# Patient Record
Sex: FEMALE | Race: BLACK OR AFRICAN AMERICAN | NOT HISPANIC OR LATINO | URBAN - METROPOLITAN AREA
[De-identification: names, ages, dates, MRNs, and addresses within clinical notes are randomized per-mention and may not be internally consistent; named-entity substitution may affect disease eponyms.]

---

## 2019-04-16 ENCOUNTER — OFFICE (OUTPATIENT)
Dept: URBAN - METROPOLITAN AREA CLINIC 33 | Facility: CLINIC | Age: 44
End: 2019-04-16

## 2019-04-16 VITALS
WEIGHT: 144 LBS | HEIGHT: 66 IN | TEMPERATURE: 97.9 F | SYSTOLIC BLOOD PRESSURE: 100 MMHG | DIASTOLIC BLOOD PRESSURE: 61 MMHG | HEART RATE: 74 BPM

## 2019-04-16 DIAGNOSIS — K59.09 OTHER CONSTIPATION: ICD-10-CM

## 2019-04-16 DIAGNOSIS — K64.0 FIRST DEGREE HEMORRHOIDS: ICD-10-CM

## 2019-04-16 DIAGNOSIS — K92.2 GASTROINTESTINAL HEMORRHAGE, UNSPECIFIED: ICD-10-CM

## 2019-04-16 PROCEDURE — 99243 OFF/OP CNSLTJ NEW/EST LOW 30: CPT

## 2020-08-04 ENCOUNTER — TELEHEALTH PROVIDED OTHER THAN IN PATIENT'S HOME (OUTPATIENT)
Dept: URBAN - METROPOLITAN AREA TELEHEALTH 7 | Facility: TELEHEALTH | Age: 45
End: 2020-08-04
Payer: COMMERCIAL

## 2020-08-04 VITALS — WEIGHT: 130 LBS | HEIGHT: 66 IN

## 2020-08-04 DIAGNOSIS — K62.5 HEMORRHAGE OF ANUS AND RECTUM: ICD-10-CM

## 2020-08-04 DIAGNOSIS — K59.09 OTHER CONSTIPATION: ICD-10-CM

## 2020-08-04 DIAGNOSIS — K64.0 FIRST DEGREE HEMORRHOIDS: ICD-10-CM

## 2020-08-04 PROCEDURE — 99213 OFFICE O/P EST LOW 20 MIN: CPT | Mod: 95 | Performed by: INTERNAL MEDICINE

## 2020-08-04 NOTE — SERVICEHPINOTES
PATIENT VERIFIED BY DATE OF BIRTH AND NAME. Patient has been consented for this telecommunication visit. 45 yo female last seen over a year ago with rectal bleeding due to int hemorrhoids was recommended to consider colonoscopy and have banding however did not followup. SHe has had 2 colonoscopies in the past 5 years that have been ok other than the hemorrhoids. SHe was recommended to continue hemorrhoidal ointment and fiber supplements.  She notes continued rectal bleeding. She feels bowels are a little better with improved diet. SHe has occ straining. She is on iron.

## 2021-05-27 ENCOUNTER — OFFICE (OUTPATIENT)
Dept: URBAN - METROPOLITAN AREA CLINIC 102 | Facility: CLINIC | Age: 46
End: 2021-05-27

## 2021-05-27 DIAGNOSIS — K62.5 HEMORRHAGE OF ANUS AND RECTUM: ICD-10-CM

## 2021-05-27 DIAGNOSIS — K62.89 OTHER SPECIFIED DISEASES OF ANUS AND RECTUM: ICD-10-CM

## 2021-05-27 DIAGNOSIS — K64.8 OTHER HEMORRHOIDS: ICD-10-CM

## 2021-05-27 DIAGNOSIS — K59.4 ANAL SPASM: ICD-10-CM

## 2021-05-27 PROCEDURE — 99214 OFFICE O/P EST MOD 30 MIN: CPT | Performed by: INTERNAL MEDICINE

## 2021-05-28 VITALS
SYSTOLIC BLOOD PRESSURE: 985 MMHG | DIASTOLIC BLOOD PRESSURE: 8 MMHG | HEIGHT: 66 IN | TEMPERATURE: 97.5 F | WEIGHT: 125 LBS | HEART RATE: 74 BPM

## 2024-07-11 ENCOUNTER — HOSPITAL ENCOUNTER (EMERGENCY)
Facility: HOSPITAL | Age: 49
Discharge: HOME | End: 2024-07-11
Attending: EMERGENCY MEDICINE
Payer: COMMERCIAL

## 2024-07-11 ENCOUNTER — APPOINTMENT (OUTPATIENT)
Facility: HOSPITAL | Age: 49
End: 2024-07-11
Attending: EMERGENCY MEDICINE
Payer: COMMERCIAL

## 2024-07-11 VITALS
BODY MASS INDEX: 19.33 KG/M2 | OXYGEN SATURATION: 99 % | SYSTOLIC BLOOD PRESSURE: 110 MMHG | RESPIRATION RATE: 20 BRPM | TEMPERATURE: 98.2 F | WEIGHT: 135 LBS | HEART RATE: 90 BPM | HEIGHT: 70 IN | DIASTOLIC BLOOD PRESSURE: 72 MMHG

## 2024-07-11 DIAGNOSIS — R56.9 SEIZURE-LIKE ACTIVITY (HCC): Primary | ICD-10-CM

## 2024-07-11 LAB
ALBUMIN SERPL-MCNC: 4.1 G/DL (ref 3.5–5)
ALBUMIN/GLOB SERPL: 1.2 (ref 1.1–2.2)
ALP SERPL-CCNC: 81 U/L (ref 45–117)
ALT SERPL-CCNC: 22 U/L (ref 12–78)
AMPHET UR QL SCN: NEGATIVE
ANION GAP SERPL CALC-SCNC: 5 MMOL/L (ref 5–15)
APPEARANCE UR: CLEAR
AST SERPL W P-5'-P-CCNC: 13 U/L (ref 15–37)
BACTERIA URNS QL MICRO: NEGATIVE /HPF
BARBITURATES UR QL SCN: NEGATIVE
BASOPHILS # BLD: 0 K/UL (ref 0–0.1)
BASOPHILS NFR BLD: 0 % (ref 0–1)
BENZODIAZ UR QL: NEGATIVE
BILIRUB SERPL-MCNC: 0.8 MG/DL (ref 0.2–1)
BILIRUB UR QL: NEGATIVE
BUN SERPL-MCNC: 11 MG/DL (ref 6–20)
BUN/CREAT SERPL: 12 (ref 12–20)
CA-I BLD-MCNC: 9.3 MG/DL (ref 8.5–10.1)
CANNABINOIDS UR QL SCN: NEGATIVE
CHLORIDE SERPL-SCNC: 107 MMOL/L (ref 97–108)
CO2 SERPL-SCNC: 27 MMOL/L (ref 21–32)
COCAINE UR QL SCN: NEGATIVE
COLOR UR: ABNORMAL
CREAT SERPL-MCNC: 0.94 MG/DL (ref 0.55–1.02)
DIFFERENTIAL METHOD BLD: ABNORMAL
EOSINOPHIL # BLD: 0 K/UL (ref 0–0.4)
EOSINOPHIL NFR BLD: 0 % (ref 0–7)
EPITH CASTS URNS QL MICRO: ABNORMAL /LPF
ERYTHROCYTE [DISTWIDTH] IN BLOOD BY AUTOMATED COUNT: 13.5 % (ref 11.5–14.5)
GLOBULIN SER CALC-MCNC: 3.4 G/DL (ref 2–4)
GLUCOSE SERPL-MCNC: 146 MG/DL (ref 65–100)
GLUCOSE UR STRIP.AUTO-MCNC: NEGATIVE MG/DL
HCT VFR BLD AUTO: 46.9 % (ref 35–47)
HGB BLD-MCNC: 15.9 G/DL (ref 11.5–16)
HGB UR QL STRIP: ABNORMAL
HYALINE CASTS URNS QL MICRO: ABNORMAL /LPF (ref 0–5)
IMM GRANULOCYTES # BLD AUTO: 0.1 K/UL (ref 0–0.04)
IMM GRANULOCYTES NFR BLD AUTO: 0 % (ref 0–0.5)
KETONES UR QL STRIP.AUTO: 5 MG/DL
LACTATE BLD-SCNC: 0.84 MMOL/L (ref 0.4–2)
LEUKOCYTE ESTERASE UR QL STRIP.AUTO: NEGATIVE
LYMPHOCYTES # BLD: 1.2 K/UL (ref 0.8–3.5)
LYMPHOCYTES NFR BLD: 8 % (ref 12–49)
Lab: NORMAL
MCH RBC QN AUTO: 30.3 PG (ref 26–34)
MCHC RBC AUTO-ENTMCNC: 33.9 G/DL (ref 30–36.5)
MCV RBC AUTO: 89.3 FL (ref 80–99)
METHADONE UR QL: NEGATIVE
MONOCYTES # BLD: 0.8 K/UL (ref 0–1)
MONOCYTES NFR BLD: 6 % (ref 5–13)
MUCOUS THREADS URNS QL MICRO: ABNORMAL /LPF
NEUTS SEG # BLD: 12.4 K/UL (ref 1.8–8)
NEUTS SEG NFR BLD: 86 % (ref 32–75)
NITRITE UR QL STRIP.AUTO: NEGATIVE
NRBC # BLD: 0 K/UL (ref 0–0.01)
NRBC BLD-RTO: 0 PER 100 WBC
OPIATES UR QL: NEGATIVE
PCP UR QL: NEGATIVE
PERFORMED BY:: NORMAL
PH UR STRIP: 6 (ref 5–8)
PLATELET # BLD AUTO: 244 K/UL (ref 150–400)
PMV BLD AUTO: 9.9 FL (ref 8.9–12.9)
POTASSIUM SERPL-SCNC: 3.8 MMOL/L (ref 3.5–5.1)
PROT SERPL-MCNC: 7.5 G/DL (ref 6.4–8.2)
PROT UR STRIP-MCNC: NEGATIVE MG/DL
RBC # BLD AUTO: 5.25 M/UL (ref 3.8–5.2)
RBC #/AREA URNS HPF: ABNORMAL /HPF (ref 0–5)
SODIUM SERPL-SCNC: 139 MMOL/L (ref 136–145)
SP GR UR REFRACTOMETRY: 1.02 (ref 1–1.03)
TROPONIN I SERPL HS-MCNC: 9 NG/L (ref 0–51)
URINE CULTURE IF INDICATED: ABNORMAL
UROBILINOGEN UR QL STRIP.AUTO: 0.1 EU/DL (ref 0.1–1)
WBC # BLD AUTO: 14.5 K/UL (ref 3.6–11)
WBC URNS QL MICRO: ABNORMAL /HPF (ref 0–4)

## 2024-07-11 PROCEDURE — 81001 URINALYSIS AUTO W/SCOPE: CPT

## 2024-07-11 PROCEDURE — 85025 COMPLETE CBC W/AUTO DIFF WBC: CPT

## 2024-07-11 PROCEDURE — 70450 CT HEAD/BRAIN W/O DYE: CPT

## 2024-07-11 PROCEDURE — 80053 COMPREHEN METABOLIC PANEL: CPT

## 2024-07-11 PROCEDURE — 84484 ASSAY OF TROPONIN QUANT: CPT

## 2024-07-11 PROCEDURE — 80307 DRUG TEST PRSMV CHEM ANLYZR: CPT

## 2024-07-11 PROCEDURE — 99284 EMERGENCY DEPT VISIT MOD MDM: CPT

## 2024-07-11 PROCEDURE — 93005 ELECTROCARDIOGRAM TRACING: CPT | Performed by: EMERGENCY MEDICINE

## 2024-07-11 PROCEDURE — 36415 COLL VENOUS BLD VENIPUNCTURE: CPT

## 2024-07-11 PROCEDURE — 72125 CT NECK SPINE W/O DYE: CPT

## 2024-07-11 PROCEDURE — 83605 ASSAY OF LACTIC ACID: CPT

## 2024-07-11 RX ORDER — 0.9 % SODIUM CHLORIDE 0.9 %
1000 INTRAVENOUS SOLUTION INTRAVENOUS
Status: DISCONTINUED | OUTPATIENT
Start: 2024-07-11 | End: 2024-07-11 | Stop reason: HOSPADM

## 2024-07-11 ASSESSMENT — PAIN - FUNCTIONAL ASSESSMENT: PAIN_FUNCTIONAL_ASSESSMENT: NONE - DENIES PAIN

## 2024-07-11 NOTE — DISCHARGE INSTRUCTIONS
care, recommend confirmation of positive findings by GC/MS.     POC Lactic Acid    Collection Time: 07/11/24  1:41 PM   Result Value Ref Range    POC Lactic Acid 0.84 0.40 - 2.00 mmol/L    Performed by: Howard Hadley    Troponin    Collection Time: 07/11/24  3:12 PM   Result Value Ref Range    Troponin, High Sensitivity 9 0 - 51 ng/L       Radiologic Studies  CT Head W/O Contrast   Final Result      No evidence for acute intracranial abnormality               Electronically signed by Laura Wynn      CT CSpine W/O Contrast   Final Result   No acute fracture      Electronically signed by ROHITH THURMAN        ------------------------------------------------------------------------------------------------------------  The evaluation and treatment you received in the Emergency Department were for an urgent problem. It is important that you follow-up with a doctor, nurse practitioner, or physician assistant to:  (1) confirm your diagnosis,  (2) re-evaluation of changes in your illness and treatment, and (3) for ongoing care. Please take your discharge instructions with you when you go to your follow-up appointment.     If you have any problem arranging a follow-up appointment, contact us!  If your symptoms become worse or you do not improve as expected, please return to us. We are available 24 hours a day.     If a prescription has been provided, please fill it as soon as possible to prevent a delay in treatment. If you have any questions or reservations about taking the medication due to side effects or interactions with other medications, please call your primary care provider or contact us directly.  Again, THANK YOU for choosing us to care for YOU!

## 2024-07-11 NOTE — ED TRIAGE NOTES
Per ems, pt present from [facility] with seizures. Pt denies any hx of seizures. 2mg of ativan given by staff at facility, at 1141 today, pt precede to have delirium stage per staff. VS stable en route, .

## 2024-07-11 NOTE — ED PROVIDER NOTES
[facility] EMERGENCY DEPT  EMERGENCY DEPARTMENT HISTORY AND PHYSICAL EXAM      Date: 7/11/2024  Patient Name: Paul Shea  MRN: [id number]  Birthdate 1975  Date of evaluation: 7/11/2024  Provider: Julian Burgos MD   Note Started: 1:13 PM EDT 7/11/24    HISTORY OF PRESENT ILLNESS     Chief Complaint   Patient presents with    Seizures       History Provided By: Patient and EMS    HPI: Paul Shea is a 48 y.o. female presents for evaluation of witnessed seizure-like activity.  EMS states they were told 2 mg of Ativan were given by staff at the facility at 1141 this morning.  Blood glucose 131 per EMS.  Patient alert and oriented, complaining only of some sleepiness but no other somatic complaints.  Patient denies prior history of stroke.    PAST MEDICAL HISTORY   Past Medical History:  Past Medical History:   Diagnosis Date    Bipolar 1 disorder (HCC)        Past Surgical History:  No past surgical history on file.    Family History:  No family history on file.    Social History:       Allergies:  Not on File    PCP: None, None    Current Meds:   Current Facility-Administered Medications   Medication Dose Route Frequency Provider Last Rate Last Admin    sodium chloride 0.9 % bolus 1,000 mL  1,000 mL IntraVENous NOW Julian Burgos MD         No current outpatient medications on file.       Social Determinants of Health:   Social Determinants of Health     Tobacco Use: Not on file   Alcohol Use: Not on file   Financial Resource Strain: Not on file   Food Insecurity: Not on file   Transportation Needs: Not on file   Physical Activity: Not on file   Stress: Not on file   Social Connections: Not on file   Intimate Partner Violence: Not on file   Depression: Not on file   Housing Stability: Not on file   Interpersonal Safety: Not At Risk (7/11/2024)    Interpersonal Safety Domain Source: IP Abuse Screening     Physical abuse: Denies     Verbal abuse: Denies     Emotional abuse: Denies     Financial abuse:

## 2024-07-12 LAB
EKG ATRIAL RATE: 74 BPM
EKG DIAGNOSIS: NORMAL
EKG P AXIS: 13 DEGREES
EKG P-R INTERVAL: 96 MS
EKG Q-T INTERVAL: 380 MS
EKG QRS DURATION: 70 MS
EKG QTC CALCULATION (BAZETT): 421 MS
EKG R AXIS: 66 DEGREES
EKG T AXIS: 59 DEGREES
EKG VENTRICULAR RATE: 74 BPM

## 2024-08-04 ENCOUNTER — HOSPITAL ENCOUNTER (EMERGENCY)
Facility: HOSPITAL | Age: 49
Discharge: HOME | End: 2024-08-04
Attending: EMERGENCY MEDICINE
Payer: COMMERCIAL

## 2024-08-04 ENCOUNTER — APPOINTMENT (OUTPATIENT)
Facility: HOSPITAL | Age: 49
End: 2024-08-04
Payer: COMMERCIAL

## 2024-08-04 VITALS
TEMPERATURE: 98.9 F | HEART RATE: 80 BPM | BODY MASS INDEX: 24.05 KG/M2 | DIASTOLIC BLOOD PRESSURE: 66 MMHG | SYSTOLIC BLOOD PRESSURE: 127 MMHG | WEIGHT: 168 LBS | RESPIRATION RATE: 18 BRPM | HEIGHT: 70 IN | OXYGEN SATURATION: 98 %

## 2024-08-04 DIAGNOSIS — R33.9 URINARY RETENTION: ICD-10-CM

## 2024-08-04 DIAGNOSIS — K59.00 CONSTIPATION, UNSPECIFIED CONSTIPATION TYPE: Primary | ICD-10-CM

## 2024-08-04 LAB
ALBUMIN SERPL-MCNC: 3.5 G/DL (ref 3.5–5)
ALBUMIN/GLOB SERPL: 1.1 (ref 1.1–2.2)
ALP SERPL-CCNC: 83 U/L (ref 45–117)
ALT SERPL-CCNC: 16 U/L (ref 12–78)
ANION GAP SERPL CALC-SCNC: 8 MMOL/L (ref 5–15)
APPEARANCE UR: ABNORMAL
AST SERPL W P-5'-P-CCNC: 18 U/L (ref 15–37)
BACTERIA URNS QL MICRO: NEGATIVE /HPF
BASOPHILS # BLD: 0 K/UL (ref 0–0.1)
BASOPHILS NFR BLD: 0 % (ref 0–1)
BILIRUB DIRECT SERPL-MCNC: 0.2 MG/DL (ref 0–0.2)
BILIRUB UR QL: NEGATIVE
BUN SERPL-MCNC: 10 MG/DL (ref 6–20)
BUN/CREAT SERPL: 16 (ref 12–20)
CA-I BLD-MCNC: 9.1 MG/DL (ref 8.5–10.1)
CHLORIDE SERPL-SCNC: 110 MMOL/L (ref 97–108)
CO2 SERPL-SCNC: 25 MMOL/L (ref 21–32)
COLOR UR: ABNORMAL
CREAT SERPL-MCNC: 0.61 MG/DL (ref 0.55–1.02)
DIFFERENTIAL METHOD BLD: ABNORMAL
EOSINOPHIL # BLD: 0 K/UL (ref 0–0.4)
EOSINOPHIL NFR BLD: 0 % (ref 0–7)
EPITH CASTS URNS QL MICRO: ABNORMAL /LPF
ERYTHROCYTE [DISTWIDTH] IN BLOOD BY AUTOMATED COUNT: 13.4 % (ref 11.5–14.5)
GLOBULIN SER CALC-MCNC: 3.2 G/DL (ref 2–4)
GLUCOSE SERPL-MCNC: 115 MG/DL (ref 65–100)
GLUCOSE UR STRIP.AUTO-MCNC: NEGATIVE MG/DL
HCT VFR BLD AUTO: 37 % (ref 35–47)
HGB BLD-MCNC: 12.7 G/DL (ref 11.5–16)
HGB UR QL STRIP: ABNORMAL
IMM GRANULOCYTES # BLD AUTO: 0.1 K/UL (ref 0–0.04)
IMM GRANULOCYTES NFR BLD AUTO: 0 % (ref 0–0.5)
KETONES UR QL STRIP.AUTO: 5 MG/DL
LEUKOCYTE ESTERASE UR QL STRIP.AUTO: NEGATIVE
LIPASE SERPL-CCNC: 13 U/L (ref 13–75)
LYMPHOCYTES # BLD: 0.7 K/UL (ref 0.8–3.5)
LYMPHOCYTES NFR BLD: 5 % (ref 12–49)
MCH RBC QN AUTO: 30.2 PG (ref 26–34)
MCHC RBC AUTO-ENTMCNC: 34.3 G/DL (ref 30–36.5)
MCV RBC AUTO: 88.1 FL (ref 80–99)
MONOCYTES # BLD: 1.3 K/UL (ref 0–1)
MONOCYTES NFR BLD: 8 % (ref 5–13)
MUCOUS THREADS URNS QL MICRO: ABNORMAL /LPF
NEUTS SEG # BLD: 13.6 K/UL (ref 1.8–8)
NEUTS SEG NFR BLD: 87 % (ref 32–75)
NITRITE UR QL STRIP.AUTO: NEGATIVE
NRBC # BLD: 0 K/UL (ref 0–0.01)
NRBC BLD-RTO: 0 PER 100 WBC
PH UR STRIP: 5 (ref 5–8)
PLATELET # BLD AUTO: 252 K/UL (ref 150–400)
PMV BLD AUTO: 9.6 FL (ref 8.9–12.9)
POTASSIUM SERPL-SCNC: 4.2 MMOL/L (ref 3.5–5.1)
PROT SERPL-MCNC: 6.7 G/DL (ref 6.4–8.2)
PROT UR STRIP-MCNC: 30 MG/DL
RBC # BLD AUTO: 4.2 M/UL (ref 3.8–5.2)
RBC #/AREA URNS HPF: ABNORMAL /HPF (ref 0–5)
SODIUM SERPL-SCNC: 143 MMOL/L (ref 136–145)
SP GR UR REFRACTOMETRY: 1.02 (ref 1–1.03)
UROBILINOGEN UR QL STRIP.AUTO: 2 EU/DL (ref 0.1–1)
WBC # BLD AUTO: 15.8 K/UL (ref 3.6–11)
WBC URNS QL MICRO: ABNORMAL /HPF (ref 0–4)

## 2024-08-04 PROCEDURE — 6360000004 HC RX CONTRAST MEDICATION: Performed by: EMERGENCY MEDICINE

## 2024-08-04 PROCEDURE — 51701 INSERT BLADDER CATHETER: CPT

## 2024-08-04 PROCEDURE — 80076 HEPATIC FUNCTION PANEL: CPT

## 2024-08-04 PROCEDURE — 83690 ASSAY OF LIPASE: CPT

## 2024-08-04 PROCEDURE — 99285 EMERGENCY DEPT VISIT HI MDM: CPT

## 2024-08-04 PROCEDURE — 85025 COMPLETE CBC W/AUTO DIFF WBC: CPT

## 2024-08-04 PROCEDURE — 81001 URINALYSIS AUTO W/SCOPE: CPT

## 2024-08-04 PROCEDURE — 96375 TX/PRO/DX INJ NEW DRUG ADDON: CPT

## 2024-08-04 PROCEDURE — 96374 THER/PROPH/DIAG INJ IV PUSH: CPT

## 2024-08-04 PROCEDURE — 74177 CT ABD & PELVIS W/CONTRAST: CPT

## 2024-08-04 PROCEDURE — 36415 COLL VENOUS BLD VENIPUNCTURE: CPT

## 2024-08-04 PROCEDURE — 6360000002 HC RX W HCPCS: Performed by: EMERGENCY MEDICINE

## 2024-08-04 PROCEDURE — 80048 BASIC METABOLIC PNL TOTAL CA: CPT

## 2024-08-04 RX ORDER — ONDANSETRON 2 MG/ML
4 INJECTION INTRAMUSCULAR; INTRAVENOUS ONCE
Status: COMPLETED | OUTPATIENT
Start: 2024-08-04 | End: 2024-08-04

## 2024-08-04 RX ORDER — KETOROLAC TROMETHAMINE 15 MG/ML
15 INJECTION, SOLUTION INTRAMUSCULAR; INTRAVENOUS
Status: COMPLETED | OUTPATIENT
Start: 2024-08-04 | End: 2024-08-04

## 2024-08-04 RX ORDER — POLYETHYLENE GLYCOL 3350 17 G/17G
17 POWDER, FOR SOLUTION ORAL DAILY
Qty: 116 G | Refills: 0 | Status: SHIPPED | OUTPATIENT
Start: 2024-08-04

## 2024-08-04 RX ADMIN — IOPAMIDOL 100 ML: 755 INJECTION, SOLUTION INTRAVENOUS at 16:36

## 2024-08-04 RX ADMIN — KETOROLAC TROMETHAMINE 15 MG: 15 INJECTION, SOLUTION INTRAMUSCULAR; INTRAVENOUS at 19:13

## 2024-08-04 RX ADMIN — ONDANSETRON 4 MG: 2 INJECTION INTRAMUSCULAR; INTRAVENOUS at 19:13

## 2024-08-04 ASSESSMENT — PAIN - FUNCTIONAL ASSESSMENT
PAIN_FUNCTIONAL_ASSESSMENT: NONE - DENIES PAIN
PAIN_FUNCTIONAL_ASSESSMENT: 0-10

## 2024-08-04 ASSESSMENT — PAIN SCALES - GENERAL
PAINLEVEL_OUTOF10: 0
PAINLEVEL_OUTOF10: 7
PAINLEVEL_OUTOF10: 7

## 2024-08-04 ASSESSMENT — PAIN DESCRIPTION - LOCATION
LOCATION: BUTTOCKS
LOCATION: ABDOMEN

## 2024-08-04 ASSESSMENT — LIFESTYLE VARIABLES
HOW OFTEN DO YOU HAVE A DRINK CONTAINING ALCOHOL: NEVER
HOW MANY STANDARD DRINKS CONTAINING ALCOHOL DO YOU HAVE ON A TYPICAL DAY: PATIENT DOES NOT DRINK

## 2024-08-04 NOTE — ED TRIAGE NOTES
Per EMS: Patient lives in [facility], patient complaining of lower abdominal pain since 5 days, in conjunction with constipation, patient is also complaining of foul smelling urine.

## 2024-08-04 NOTE — ED PROVIDER NOTES
Course as of 08/04/24 2031   Sun Aug 04, 2024   2027 Patient with good results after her enema, had removal of urine via straight cath and reports feeling better at this time.  Discussed with patient and caregiver the plan to start on daily MiraLAX.  Return precautions discussed at length. [CS]      ED Course User Index  [CS] Julian Burgos MD       SEPSIS Reassessment: Sepsis reassessment not applicable    Clinical Management Tools:  Not Applicable    Patient was given the following medications:  Medications   ketorolac (TORADOL) injection 15 mg (15 mg IntraVENous Given 8/4/24 1913)   ondansetron (ZOFRAN) injection 4 mg (4 mg IntraVENous Given 8/4/24 1913)   iopamidol (ISOVUE-370) 76 % injection 100 mL (100 mLs IntraVENous Given 8/4/24 1636)       CONSULTS: See ED Course/MDM for further details.  None     Social Determinants affecting Diagnosis/Treatment: None    Smoking Cessation: Not Applicable    PROCEDURES   Unless otherwise noted above, none  Procedures      CRITICAL CARE TIME   Patient does not meet Critical Care Time, 0 minutes    ED IMPRESSION     1. Constipation, unspecified constipation type    2. Urinary retention          DISPOSITION/PLAN   DISPOSITION Decision To Discharge 08/04/2024 08:29:28 PM    Discharge Note: The patient is stable for discharge home. The signs, symptoms, diagnosis, and discharge instructions have been discussed, understanding conveyed, and agreed upon. The patient is to follow up as recommended or return to ER should their symptoms worsen.      PATIENT REFERRED TO:  George Mabry MD  [address] [facility] [address]  [phone]    Schedule an appointment as soon as possible for a visit       Ilda Germain MD  [address] [place] [id number]  [phone]    Schedule an appointment as soon as possible for a visit           DISCHARGE MEDICATIONS:     Medication List        START taking these medications      polyethylene glycol 17

## 2024-08-05 NOTE — DISCHARGE INSTRUCTIONS
Thank you for choosing our Emergency Department for your care.  It is our privilege to care for you in your time of need.  In the next several days, you may receive a survey via email or mailed to your home about your experience with our team.  We would greatly appreciate you taking a few minutes to complete the survey, as we use this information to learn what we have done well and what we could be doing better. Thank you for trusting us with your care!    Below you will find a list of your tests from today's visit.   Labs  Recent Results (from the past 12 hour(s))   BMP    Collection Time: 08/04/24  4:03 PM   Result Value Ref Range    Sodium 143 136 - 145 mmol/L    Potassium 4.2 3.5 - 5.1 mmol/L    Chloride 110 (H) 97 - 108 mmol/L    CO2 25 21 - 32 mmol/L    Anion Gap 8 5 - 15 mmol/L    Glucose 115 (H) 65 - 100 mg/dL    BUN 10 6 - 20 mg/dL    Creatinine 0.61 0.55 - 1.02 mg/dL    BUN/Creatinine Ratio 16 12 - 20      Est, Glom Filt Rate >90 >60 ml/min/1.73m2    Calcium 9.1 8.5 - 10.1 mg/dL   CBC with Auto Differential    Collection Time: 08/04/24  4:03 PM   Result Value Ref Range    WBC 15.8 (H) 3.6 - 11.0 K/uL    RBC 4.20 3.80 - 5.20 M/uL    Hemoglobin 12.7 11.5 - 16.0 g/dL    Hematocrit 37.0 35.0 - 47.0 %    MCV 88.1 80.0 - 99.0 FL    MCH 30.2 26.0 - 34.0 PG    MCHC 34.3 30.0 - 36.5 g/dL    RDW 13.4 11.5 - 14.5 %    Platelets 252 150 - 400 K/uL    MPV 9.6 8.9 - 12.9 FL    Nucleated RBCs 0.0 0.0  WBC    nRBC 0.00 0.00 - 0.01 K/uL    Neutrophils % 87 (H) 32 - 75 %    Lymphocytes % 5 (L) 12 - 49 %    Monocytes % 8 5 - 13 %    Eosinophils % 0 0 - 7 %    Basophils % 0 0 - 1 %    Immature Granulocytes % 0 0 - 0.5 %    Neutrophils Absolute 13.6 (H) 1.8 - 8.0 K/UL    Lymphocytes Absolute 0.7 (L) 0.8 - 3.5 K/UL    Monocytes Absolute 1.3 (H) 0.0 - 1.0 K/UL    Eosinophils Absolute 0.0 0.0 - 0.4 K/UL    Basophils Absolute 0.0 0.0 - 0.1 K/UL    Immature Granulocytes Absolute 0.1 (H) 0.00 - 0.04 K/UL    Differential

## 2024-08-05 NOTE — ED NOTES
Pt laying semi fowlers in bed with eyes open, adequate chest rise and fall, strong radial pulse, and skin color WNL. Pt currently has no complaints.

## 2024-08-06 ENCOUNTER — HOSPITAL ENCOUNTER (EMERGENCY)
Facility: HOSPITAL | Age: 49
Discharge: HOME | End: 2024-08-07
Attending: EMERGENCY MEDICINE
Payer: COMMERCIAL

## 2024-08-06 ENCOUNTER — APPOINTMENT (OUTPATIENT)
Facility: HOSPITAL | Age: 49
End: 2024-08-06
Payer: COMMERCIAL

## 2024-08-06 DIAGNOSIS — R33.8 ACUTE URINARY RETENTION: Primary | ICD-10-CM

## 2024-08-06 PROCEDURE — 74018 RADEX ABDOMEN 1 VIEW: CPT

## 2024-08-06 PROCEDURE — 51798 US URINE CAPACITY MEASURE: CPT

## 2024-08-06 PROCEDURE — 99284 EMERGENCY DEPT VISIT MOD MDM: CPT

## 2024-08-06 PROCEDURE — 80048 BASIC METABOLIC PNL TOTAL CA: CPT

## 2024-08-06 PROCEDURE — 81001 URINALYSIS AUTO W/SCOPE: CPT

## 2024-08-06 ASSESSMENT — PAIN - FUNCTIONAL ASSESSMENT: PAIN_FUNCTIONAL_ASSESSMENT: 0-10

## 2024-08-06 ASSESSMENT — PAIN SCALES - GENERAL: PAINLEVEL_OUTOF10: 7

## 2024-08-07 VITALS
HEIGHT: 70 IN | TEMPERATURE: 98.6 F | SYSTOLIC BLOOD PRESSURE: 116 MMHG | WEIGHT: 168 LBS | HEART RATE: 82 BPM | OXYGEN SATURATION: 100 % | BODY MASS INDEX: 24.05 KG/M2 | RESPIRATION RATE: 16 BRPM | DIASTOLIC BLOOD PRESSURE: 70 MMHG

## 2024-08-07 LAB
ANION GAP SERPL CALC-SCNC: 8 MMOL/L (ref 5–15)
APPEARANCE UR: CLEAR
BACTERIA URNS QL MICRO: NEGATIVE /HPF
BILIRUB UR QL: NEGATIVE
BUN SERPL-MCNC: 10 MG/DL (ref 6–20)
BUN/CREAT SERPL: 13 (ref 12–20)
CA-I BLD-MCNC: 9.4 MG/DL (ref 8.5–10.1)
CHLORIDE SERPL-SCNC: 104 MMOL/L (ref 97–108)
CO2 SERPL-SCNC: 25 MMOL/L (ref 21–32)
COLOR UR: ABNORMAL
CREAT SERPL-MCNC: 0.78 MG/DL (ref 0.55–1.02)
EPITH CASTS URNS QL MICRO: ABNORMAL /LPF
GLUCOSE SERPL-MCNC: 110 MG/DL (ref 65–100)
GLUCOSE UR STRIP.AUTO-MCNC: NEGATIVE MG/DL
HGB UR QL STRIP: ABNORMAL
KETONES UR QL STRIP.AUTO: 5 MG/DL
LEUKOCYTE ESTERASE UR QL STRIP.AUTO: NEGATIVE
MUCOUS THREADS URNS QL MICRO: ABNORMAL /LPF
NITRITE UR QL STRIP.AUTO: NEGATIVE
PH UR STRIP: 5 (ref 5–8)
POTASSIUM SERPL-SCNC: 3.9 MMOL/L (ref 3.5–5.1)
PROT UR STRIP-MCNC: NEGATIVE MG/DL
RBC #/AREA URNS HPF: ABNORMAL /HPF (ref 0–5)
SODIUM SERPL-SCNC: 137 MMOL/L (ref 136–145)
SP GR UR REFRACTOMETRY: 1.01 (ref 1–1.03)
URINE CULTURE IF INDICATED: ABNORMAL
UROBILINOGEN UR QL STRIP.AUTO: 0.1 EU/DL (ref 0.1–1)
WBC URNS QL MICRO: ABNORMAL /HPF (ref 0–4)

## 2024-08-07 ASSESSMENT — PAIN SCALES - GENERAL
PAINLEVEL_OUTOF10: 0
PAINLEVEL_OUTOF10: 3

## 2024-08-07 ASSESSMENT — PAIN - FUNCTIONAL ASSESSMENT
PAIN_FUNCTIONAL_ASSESSMENT: 0-10
PAIN_FUNCTIONAL_ASSESSMENT: 0-10

## 2024-08-07 NOTE — ED NOTES
Report given to [facility] supervisor on pt with recommendation to straight cath every 4-6 hours and discontinue trazodone.

## 2024-08-07 NOTE — ED NOTES
Nurse spoke with Tresa castro at [facility] and they are unable to take patient back with esparza or with straight cath orders either. Provider aware.

## 2024-08-07 NOTE — DISCHARGE INSTRUCTIONS
I recommend that you stop taking trazodone and coordination with your doctors at [facility] as this medication may be causing your difficulty with urinating.  You need to intermittently straight cath every 4-6 hours if you are not able to adequately empty her bladder on her own.        Thank you for choosing our Emergency Department for your care.  It is our privilege to care for you in your time of need.  In the next several days, you may receive a survey via email or mailed to your home about your experience with our team.  We would greatly appreciate you taking a few minutes to complete the survey, as we use this information to learn what we have done well and what we could be doing better. Thank you for trusting us with your care!    Below you will find a list of your tests from today's visit.   Labs  Recent Results (from the past 12 hour(s))   Urinalysis with Reflex to Culture    Collection Time: 08/06/24 11:50 PM    Specimen: Urine   Result Value Ref Range    Color, UA Yellow/Straw      Appearance Clear Clear      Specific Gravity, UA 1.011 1.003 - 1.030      pH, Urine 5.0 5.0 - 8.0      Protein, UA Negative Negative mg/dL    Glucose, Ur Negative Negative mg/dL    Ketones, Urine 5 (A) Negative mg/dL    Bilirubin, Urine Negative Negative      Blood, Urine Large (A) Negative      Urobilinogen, Urine 0.1 0.1 - 1.0 EU/dL    Nitrite, Urine Negative Negative      Leukocyte Esterase, Urine Negative Negative      WBC, UA 0-4 0 - 4 /hpf    RBC, UA 10-20 0 - 5 /hpf    Epithelial Cells, UA Few Few /lpf    BACTERIA, URINE Negative Negative /hpf    Urine Culture if Indicated Culture not indicated by UA result Culture not indicated by UA result      Mucus, UA 1+ (A) Negative /lpf   BMP    Collection Time: 08/06/24 11:50 PM   Result Value Ref Range    Sodium 137 136 - 145 mmol/L    Potassium 3.9 3.5 - 5.1 mmol/L    Chloride 104 97 - 108 mmol/L    CO2 25 21 - 32 mmol/L    Anion Gap 8 5 - 15 mmol/L    Glucose 110 (H) 65 -

## 2024-08-07 NOTE — BSMART NOTE
Writer spoke with Tresa, nursing supervisor, at [facility] in regards to patient. She states that patient was admitted to [facility] under TDO. She provided information for medical doctor on call at [facility]. Medical doctor, Dr. Butt's, phone number given to Dr. Montaño to contact in regards to patient's case.

## 2024-08-07 NOTE — ED PROVIDER NOTES
[facility] EMERGENCY DEPT  EMERGENCY DEPARTMENT HISTORY AND PHYSICAL EXAM      Date: 8/6/2024  Patient Name: Paul Shea  MRN: [id number]  Birthdate 1975  Date of evaluation: 8/6/2024  Provider: Giselle Montaño MD   Note Started: 11:56 PM EDT 8/6/24    HISTORY OF PRESENT ILLNESS     Chief Complaint   Patient presents with    Abdominal Pain    Urinary Retention    Constipation       History Provided By: Patient    HPI: Paul Shea is a 48 y.o. female past medical history of bipolar disorder currently admitted to [facility] presents for evaluation of urinary retention and abdominal discomfort  Patient on bladder scan had greater than 1 L of urine in her bladder.  After she was straight cathed her discomfort completely resolved.  She states that she has had some small bowel movement since leaving the hospital 2 days ago after she was diagnosed with constipation likely causing her urinary retention.  However she is still had difficulty emptying her bladder fully.  States she has had some trickle of urine but has not adequately emptied her bladder since she left the hospital.  Of note, patient does report starting Lexapro, risperidone and trazodone since her hospitalization    PAST MEDICAL HISTORY   Past Medical History:  Past Medical History:   Diagnosis Date    Bipolar 1 disorder (HCC)        Past Surgical History:  History reviewed. No pertinent surgical history.    Family History:  History reviewed. No pertinent family history.    Social History:  Social History     Tobacco Use    Smoking status: Some Days     Current packs/day: 1.00     Types: Cigarettes   Substance Use Topics    Alcohol use: Not Currently    Drug use: Never       Allergies:  Allergies   Allergen Reactions    Trazodone And Nefazodone        PCP: None, None    Current Meds:   No current facility-administered medications for this encounter.     Current Outpatient Medications   Medication Sig Dispense Refill    polyethylene glycol (GLYCOLAX) 17